# Patient Record
Sex: FEMALE | Race: WHITE | NOT HISPANIC OR LATINO
[De-identification: names, ages, dates, MRNs, and addresses within clinical notes are randomized per-mention and may not be internally consistent; named-entity substitution may affect disease eponyms.]

---

## 2020-12-23 ENCOUNTER — TRANSCRIPTION ENCOUNTER (OUTPATIENT)
Age: 80
End: 2020-12-23

## 2022-03-07 ENCOUNTER — TRANSCRIPTION ENCOUNTER (OUTPATIENT)
Age: 82
End: 2022-03-07

## 2023-12-07 ENCOUNTER — NON-APPOINTMENT (OUTPATIENT)
Age: 83
End: 2023-12-07

## 2023-12-07 DIAGNOSIS — Z92.89 PERSONAL HISTORY OF OTHER MEDICAL TREATMENT: ICD-10-CM

## 2023-12-07 DIAGNOSIS — K22.70 BARRETT'S ESOPHAGUS W/OUT DYSPLASIA: ICD-10-CM

## 2023-12-07 DIAGNOSIS — K21.9 GASTRO-ESOPHAGEAL REFLUX DISEASE W/OUT ESOPHAGITIS: ICD-10-CM

## 2023-12-07 DIAGNOSIS — E78.00 PURE HYPERCHOLESTEROLEMIA, UNSPECIFIED: ICD-10-CM

## 2023-12-07 DIAGNOSIS — Z78.9 OTHER SPECIFIED HEALTH STATUS: ICD-10-CM

## 2023-12-07 DIAGNOSIS — I10 ESSENTIAL (PRIMARY) HYPERTENSION: ICD-10-CM

## 2023-12-07 DIAGNOSIS — Z98.890 OTHER SPECIFIED POSTPROCEDURAL STATES: ICD-10-CM

## 2023-12-07 RX ORDER — ROSUVASTATIN CALCIUM 5 MG/1
5 TABLET, FILM COATED ORAL DAILY
Refills: 0 | Status: ACTIVE | COMMUNITY

## 2024-01-05 ENCOUNTER — APPOINTMENT (OUTPATIENT)
Dept: INTERNAL MEDICINE | Facility: CLINIC | Age: 84
End: 2024-01-05
Payer: MEDICARE

## 2024-01-05 VITALS
BODY MASS INDEX: 33.75 KG/M2 | HEIGHT: 66 IN | SYSTOLIC BLOOD PRESSURE: 129 MMHG | WEIGHT: 210 LBS | DIASTOLIC BLOOD PRESSURE: 74 MMHG

## 2024-01-05 DIAGNOSIS — R06.00 DYSPNEA, UNSPECIFIED: ICD-10-CM

## 2024-01-05 PROCEDURE — 99213 OFFICE O/P EST LOW 20 MIN: CPT

## 2024-01-05 RX ORDER — CIPROFLOXACIN HYDROCHLORIDE 250 MG/1
250 TABLET, FILM COATED ORAL
Refills: 0 | Status: DISCONTINUED | COMMUNITY
End: 2024-01-05

## 2024-01-05 RX ORDER — FUROSEMIDE 20 MG/1
20 TABLET ORAL DAILY
Refills: 0 | Status: DISCONTINUED | COMMUNITY
End: 2024-01-05

## 2024-01-05 RX ORDER — ESOMEPRAZOLE MAGNESIUM 40 MG/1
40 CAPSULE, DELAYED RELEASE ORAL DAILY
Refills: 0 | Status: ACTIVE | COMMUNITY
Start: 2024-01-05

## 2024-01-05 RX ORDER — GABAPENTIN 100 MG
100 TABLET ORAL DAILY
Refills: 0 | Status: DISCONTINUED | COMMUNITY
End: 2024-01-05

## 2024-01-05 NOTE — PHYSICAL EXAM
[No Acute Distress] : no acute distress [Well Nourished] : well nourished [Normal Sclera/Conjunctiva] : normal sclera/conjunctiva [Normal Outer Ear/Nose] : the outer ears and nose were normal in appearance [No JVD] : no jugular venous distention [No Lymphadenopathy] : no lymphadenopathy [Supple] : supple [No Respiratory Distress] : no respiratory distress  [No Accessory Muscle Use] : no accessory muscle use [Clear to Auscultation] : lungs were clear to auscultation bilaterally [Normal Rate] : normal rate  [Regular Rhythm] : with a regular rhythm [Normal S1, S2] : normal S1 and S2 [No Edema] : there was no peripheral edema [Soft] : abdomen soft [Non Tender] : non-tender [Non-distended] : non-distended [Normal Anterior Cervical Nodes] : no anterior cervical lymphadenopathy [No Rash] : no rash [Coordination Grossly Intact] : coordination grossly intact [No Focal Deficits] : no focal deficits [Normal Gait] : normal gait [Normal Affect] : the affect was normal [Normal Insight/Judgement] : insight and judgment were intact

## 2024-01-08 ENCOUNTER — APPOINTMENT (OUTPATIENT)
Dept: CARDIOLOGY | Facility: CLINIC | Age: 84
End: 2024-01-08
Payer: MEDICARE

## 2024-01-08 ENCOUNTER — APPOINTMENT (OUTPATIENT)
Dept: CARDIOLOGY | Facility: CLINIC | Age: 84
End: 2024-01-08

## 2024-01-08 VITALS
SYSTOLIC BLOOD PRESSURE: 132 MMHG | WEIGHT: 210 LBS | BODY MASS INDEX: 33.75 KG/M2 | DIASTOLIC BLOOD PRESSURE: 78 MMHG | HEIGHT: 66 IN

## 2024-01-08 PROCEDURE — 93015 CV STRESS TEST SUPVJ I&R: CPT

## 2024-01-10 LAB — NT-PROBNP SERPL-MCNC: 182 PG/ML

## 2024-01-10 NOTE — HISTORY OF PRESENT ILLNESS
[No Pertinent Pulmonary History] : no history of asthma, COPD, sleep apnea, or smoking [No Adverse Anesthesia Reaction] : no adverse anesthesia reaction in self or family member [Chronic Anticoagulation] : no chronic anticoagulation [Chronic Kidney Disease] : no chronic kidney disease [Diabetes] : no diabetes [FreeTextEntry2] : 1/16/2023 [FreeTextEntry1] : right cataract  [FreeTextEntry3] : Dr. Duarte [FreeTextEntry4] : 83F, here for preoperative clearence for right eye cataract surgery.  Pt is seeing cardiology and an echo on Monday to evaluate chronic exertional SOB.  [FreeTextEntry6] : pt denies chest pain, claudication, orthopnea, palpitations or syncope. Pt has chronic dyspnea on exertion that does not limit her daily acitivties. Pt still works and takes care of herself.  [FreeTextEntry7] : pending Echo and cardiac clearence.

## 2024-01-10 NOTE — PLAN
[FreeTextEntry1] : 83-year-old female here for cataract surgery preop clearance.  Chronic exertional shortness of breath, x-rays is negative for acute pulmonary process.  Patient has no medical contraindication to cataract surgery assuming she is cleared by cardiology.  Patient is pending echo with cardiology.  Patient advised to follow-up with pulmonary after cataract surgery to evaluate repeat etiology of her chronic exertional shortness of breath.

## 2024-01-10 NOTE — ASSESSMENT
[Patient Requires Further Testing] : Patient requires further testing [Echocardiogram] : echocardiogram [Cardiology consultation] : Cardiology consultation [As per surgery] : as per surgery [FreeTextEntry4] : 83-year-old female here for preoperative evaluation prior to right cataract surgery Patient is sent for a chest x-ray for her chronic dyspnea on exertion.  Chest x-ray is negative for acute pulmonary process.

## 2024-01-17 ENCOUNTER — NON-APPOINTMENT (OUTPATIENT)
Age: 84
End: 2024-01-17

## 2024-03-12 ENCOUNTER — RX RENEWAL (OUTPATIENT)
Age: 84
End: 2024-03-12

## 2024-03-12 RX ORDER — LOSARTAN POTASSIUM 100 MG/1
100 TABLET, FILM COATED ORAL DAILY
Qty: 90 | Refills: 3 | Status: ACTIVE | COMMUNITY
Start: 2024-03-12 | End: 1900-01-01

## 2024-08-10 ENCOUNTER — RX RENEWAL (OUTPATIENT)
Age: 84
End: 2024-08-10

## 2024-10-16 ENCOUNTER — APPOINTMENT (OUTPATIENT)
Dept: INTERNAL MEDICINE | Facility: CLINIC | Age: 84
End: 2024-10-16
Payer: MEDICARE

## 2024-10-16 VITALS
SYSTOLIC BLOOD PRESSURE: 134 MMHG | BODY MASS INDEX: 33.75 KG/M2 | OXYGEN SATURATION: 95 % | DIASTOLIC BLOOD PRESSURE: 83 MMHG | HEIGHT: 66 IN | WEIGHT: 210 LBS | HEART RATE: 102 BPM

## 2024-10-16 DIAGNOSIS — I10 ESSENTIAL (PRIMARY) HYPERTENSION: ICD-10-CM

## 2024-10-16 DIAGNOSIS — E78.00 PURE HYPERCHOLESTEROLEMIA, UNSPECIFIED: ICD-10-CM

## 2024-10-16 DIAGNOSIS — R06.02 SHORTNESS OF BREATH: ICD-10-CM

## 2024-10-16 DIAGNOSIS — H91.90 UNSPECIFIED HEARING LOSS, UNSPECIFIED EAR: ICD-10-CM

## 2024-10-16 PROCEDURE — G2211 COMPLEX E/M VISIT ADD ON: CPT

## 2024-10-16 PROCEDURE — 99203 OFFICE O/P NEW LOW 30 MIN: CPT

## 2024-10-16 PROCEDURE — 99213 OFFICE O/P EST LOW 20 MIN: CPT

## 2024-10-17 LAB
ALBUMIN SERPL ELPH-MCNC: 4.1 G/DL
ALP BLD-CCNC: 116 U/L
ALT SERPL-CCNC: 8 U/L
ANION GAP SERPL CALC-SCNC: 17 MMOL/L
AST SERPL-CCNC: 13 U/L
BILIRUB SERPL-MCNC: 0.3 MG/DL
BUN SERPL-MCNC: 24 MG/DL
CALCIUM SERPL-MCNC: 9.6 MG/DL
CHLORIDE SERPL-SCNC: 109 MMOL/L
CHOLEST SERPL-MCNC: 148 MG/DL
CO2 SERPL-SCNC: 22 MMOL/L
CREAT SERPL-MCNC: 0.98 MG/DL
EGFR: 58 ML/MIN/1.73M2
ESTIMATED AVERAGE GLUCOSE: 128 MG/DL
GLUCOSE SERPL-MCNC: 114 MG/DL
HBA1C MFR BLD HPLC: 6.1 %
HDLC SERPL-MCNC: 54 MG/DL
LDLC SERPL CALC-MCNC: 71 MG/DL
NONHDLC SERPL-MCNC: 94 MG/DL
POTASSIUM SERPL-SCNC: 4.8 MMOL/L
PROT SERPL-MCNC: 6.6 G/DL
SODIUM SERPL-SCNC: 147 MMOL/L
TRIGL SERPL-MCNC: 129 MG/DL

## 2025-03-31 ENCOUNTER — RX RENEWAL (OUTPATIENT)
Age: 85
End: 2025-03-31

## 2025-06-19 ENCOUNTER — APPOINTMENT (OUTPATIENT)
Dept: CARDIOLOGY | Facility: CLINIC | Age: 85
End: 2025-06-19
Payer: MEDICARE

## 2025-06-19 ENCOUNTER — NON-APPOINTMENT (OUTPATIENT)
Age: 85
End: 2025-06-19

## 2025-06-19 VITALS
HEART RATE: 99 BPM | DIASTOLIC BLOOD PRESSURE: 70 MMHG | OXYGEN SATURATION: 96 % | SYSTOLIC BLOOD PRESSURE: 134 MMHG | BODY MASS INDEX: 34.54 KG/M2 | WEIGHT: 214 LBS

## 2025-06-19 PROCEDURE — 99214 OFFICE O/P EST MOD 30 MIN: CPT

## 2025-06-19 PROCEDURE — 99204 OFFICE O/P NEW MOD 45 MIN: CPT

## 2025-07-10 ENCOUNTER — APPOINTMENT (OUTPATIENT)
Dept: CARDIOLOGY | Facility: CLINIC | Age: 85
End: 2025-07-10
Payer: MEDICARE

## 2025-07-10 PROCEDURE — 93306 TTE W/DOPPLER COMPLETE: CPT

## 2025-08-14 ENCOUNTER — RX RENEWAL (OUTPATIENT)
Age: 85
End: 2025-08-14